# Patient Record
Sex: FEMALE | Race: BLACK OR AFRICAN AMERICAN | Employment: UNEMPLOYED | URBAN - METROPOLITAN AREA
[De-identification: names, ages, dates, MRNs, and addresses within clinical notes are randomized per-mention and may not be internally consistent; named-entity substitution may affect disease eponyms.]

---

## 2020-02-20 ENCOUNTER — HOSPITAL ENCOUNTER (OUTPATIENT)
Age: 20
Discharge: HOME | End: 2020-02-20
Payer: COMMERCIAL

## 2020-02-20 LAB
BASOPHILS # BLD: 1.3 %
BASOPHILS ABSOLUTE: 0.1 THOU/MM3 (ref 0–0.1)
EOSINOPHIL # BLD: 6.3 %
EOSINOPHILS ABSOLUTE: 0.3 THOU/MM3 (ref 0–0.4)
ERYTHROCYTE [DISTWIDTH] IN BLOOD BY AUTOMATED COUNT: 13.8 % (ref 11.5–14.5)
ERYTHROCYTE [DISTWIDTH] IN BLOOD BY AUTOMATED COUNT: 43.9 FL (ref 35–45)
HCT VFR BLD CALC: 45.3 % (ref 37–47)
HEMOGLOBIN: 13.9 GM/DL (ref 12–16)
IMMATURE GRANS (ABS): 0.01 THOU/MM3 (ref 0–0.07)
IMMATURE GRANULOCYTES: 0.3 %
LYMPHOCYTES # BLD: 23.5 %
LYMPHOCYTES ABSOLUTE: 0.9 THOU/MM3 (ref 1–4.8)
MCH RBC QN AUTO: 26.8 PG (ref 26–33)
MCHC RBC AUTO-ENTMCNC: 30.7 GM/DL (ref 32.2–35.5)
MCV RBC AUTO: 87.3 FL (ref 81–99)
MONOCYTES # BLD: 8.1 %
MONOCYTES ABSOLUTE: 0.3 THOU/MM3 (ref 0.4–1.3)
NUCLEATED RED BLOOD CELLS: 0 /100 WBC
PLATELET # BLD: 108 THOU/MM3 (ref 130–400)
PMV BLD AUTO: 12.5 FL (ref 9.4–12.4)
RBC # BLD: 5.19 MILL/MM3 (ref 4.2–5.4)
SEG NEUTROPHILS: 60.5 %
SEGMENTED NEUTROPHILS ABSOLUTE COUNT: 2.4 THOU/MM3 (ref 1.8–7.7)
WBC # BLD: 4 THOU/MM3 (ref 4.8–10.8)

## 2020-02-20 PROCEDURE — 36415 COLL VENOUS BLD VENIPUNCTURE: CPT

## 2020-02-20 PROCEDURE — 85025 COMPLETE CBC W/AUTO DIFF WBC: CPT

## 2020-09-28 ENCOUNTER — HOSPITAL ENCOUNTER (OUTPATIENT)
Age: 20
Discharge: HOME | DRG: 885 | End: 2020-09-28
Attending: PSYCHIATRY & NEUROLOGY | Admitting: PSYCHIATRY & NEUROLOGY
Payer: COMMERCIAL

## 2020-09-28 ENCOUNTER — HOSPITAL ENCOUNTER (INPATIENT)
Age: 20
LOS: 3 days | Discharge: HOME | DRG: 885 | End: 2020-10-01
Attending: EMERGENCY MEDICINE | Admitting: PSYCHIATRY & NEUROLOGY
Payer: COMMERCIAL

## 2020-09-28 PROBLEM — F32.9 MAJOR DEPRESSION, SINGLE EPISODE: Status: ACTIVE | Noted: 2020-09-28

## 2020-09-28 LAB
SARS-COV-2, RAPID: NOT DETECTED
SARS-COV-2: NORMAL
SARS-COV-2: NORMAL
SOURCE: NORMAL

## 2020-09-28 PROCEDURE — 1240000000 HC EMOTIONAL WELLNESS R&B

## 2020-09-28 PROCEDURE — 6360000002 HC RX W HCPCS: Performed by: EMERGENCY MEDICINE

## 2020-09-28 PROCEDURE — U0002 COVID-19 LAB TEST NON-CDC: HCPCS

## 2020-09-28 PROCEDURE — 96372 THER/PROPH/DIAG INJ SC/IM: CPT

## 2020-09-28 PROCEDURE — 2720000011 HC SANE KIT SUPPLY STERILE

## 2020-09-28 PROCEDURE — 6370000000 HC RX 637 (ALT 250 FOR IP): Performed by: EMERGENCY MEDICINE

## 2020-09-28 PROCEDURE — 99285 EMERGENCY DEPT VISIT HI MDM: CPT

## 2020-09-28 PROCEDURE — 6370000000 HC RX 637 (ALT 250 FOR IP)

## 2020-09-28 RX ORDER — ACETAMINOPHEN 325 MG/1
TABLET ORAL
Status: COMPLETED
Start: 2020-09-28 | End: 2020-09-28

## 2020-09-28 RX ORDER — HYDROXYZINE 50 MG/1
50 TABLET, FILM COATED ORAL 3 TIMES DAILY PRN
Status: DISCONTINUED | OUTPATIENT
Start: 2020-09-28 | End: 2020-10-01 | Stop reason: HOSPADM

## 2020-09-28 RX ORDER — AZITHROMYCIN 250 MG/1
1000 TABLET, FILM COATED ORAL ONCE
Status: COMPLETED | OUTPATIENT
Start: 2020-09-28 | End: 2020-09-28

## 2020-09-28 RX ORDER — MONTELUKAST SODIUM 10 MG/1
10 TABLET ORAL NIGHTLY
COMMUNITY

## 2020-09-28 RX ORDER — IBUPROFEN 400 MG/1
400 TABLET ORAL EVERY 6 HOURS PRN
Status: DISCONTINUED | OUTPATIENT
Start: 2020-09-28 | End: 2020-10-01 | Stop reason: HOSPADM

## 2020-09-28 RX ORDER — ELTROMBOPAG OLAMINE 50 MG/1
50 TABLET, FILM COATED ORAL
COMMUNITY

## 2020-09-28 RX ORDER — MAGNESIUM HYDROXIDE/ALUMINUM HYDROXICE/SIMETHICONE 120; 1200; 1200 MG/30ML; MG/30ML; MG/30ML
30 SUSPENSION ORAL EVERY 6 HOURS PRN
Status: DISCONTINUED | OUTPATIENT
Start: 2020-09-28 | End: 2020-10-01 | Stop reason: HOSPADM

## 2020-09-28 RX ORDER — TRAZODONE HYDROCHLORIDE 50 MG/1
50 TABLET ORAL NIGHTLY PRN
Status: DISCONTINUED | OUTPATIENT
Start: 2020-09-28 | End: 2020-10-01 | Stop reason: HOSPADM

## 2020-09-28 RX ORDER — POLYETHYLENE GLYCOL 3350 17 G/17G
17 POWDER, FOR SOLUTION ORAL DAILY PRN
Status: DISCONTINUED | OUTPATIENT
Start: 2020-09-28 | End: 2020-10-01 | Stop reason: HOSPADM

## 2020-09-28 RX ORDER — ONDANSETRON 4 MG/1
TABLET, ORALLY DISINTEGRATING ORAL
Status: COMPLETED
Start: 2020-09-28 | End: 2020-09-28

## 2020-09-28 RX ORDER — CEFTRIAXONE SODIUM 250 MG/1
250 INJECTION, POWDER, FOR SOLUTION INTRAMUSCULAR; INTRAVENOUS ONCE
Status: COMPLETED | OUTPATIENT
Start: 2020-09-28 | End: 2020-09-28

## 2020-09-28 RX ORDER — METRONIDAZOLE 500 MG/1
2000 TABLET ORAL ONCE
Status: COMPLETED | OUTPATIENT
Start: 2020-09-28 | End: 2020-09-28

## 2020-09-28 RX ORDER — ACETAMINOPHEN 325 MG/1
650 TABLET ORAL ONCE
Status: COMPLETED | OUTPATIENT
Start: 2020-09-28 | End: 2020-09-28

## 2020-09-28 RX ORDER — ACETAMINOPHEN 325 MG/1
650 TABLET ORAL EVERY 4 HOURS PRN
Status: DISCONTINUED | OUTPATIENT
Start: 2020-09-28 | End: 2020-10-01 | Stop reason: HOSPADM

## 2020-09-28 RX ORDER — LOPERAMIDE HYDROCHLORIDE 2 MG/1
2 CAPSULE ORAL 4 TIMES DAILY PRN
Status: DISCONTINUED | OUTPATIENT
Start: 2020-09-28 | End: 2020-10-01 | Stop reason: HOSPADM

## 2020-09-28 RX ADMIN — METRONIDAZOLE 2000 MG: 500 TABLET, FILM COATED ORAL at 14:38

## 2020-09-28 RX ADMIN — CEFTRIAXONE SODIUM 250 MG: 250 INJECTION, POWDER, FOR SOLUTION INTRAMUSCULAR; INTRAVENOUS at 14:38

## 2020-09-28 RX ADMIN — ACETAMINOPHEN 650 MG: 325 TABLET ORAL at 19:19

## 2020-09-28 RX ADMIN — ONDANSETRON 4 MG: 4 TABLET, ORALLY DISINTEGRATING ORAL at 15:12

## 2020-09-28 RX ADMIN — ACETAMINOPHEN 650 MG: 325 TABLET, FILM COATED ORAL at 19:19

## 2020-09-28 RX ADMIN — AZITHROMYCIN MONOHYDRATE 1000 MG: 250 TABLET ORAL at 14:38

## 2020-09-28 ASSESSMENT — ENCOUNTER SYMPTOMS
DIARRHEA: 0
TROUBLE SWALLOWING: 0
BACK PAIN: 0
COLOR CHANGE: 0
ABDOMINAL PAIN: 0
CONSTIPATION: 0
COUGH: 0
NAUSEA: 0
SORE THROAT: 0
VOMITING: 0
SHORTNESS OF BREATH: 0
BLOOD IN STOOL: 0

## 2020-09-28 ASSESSMENT — SLEEP AND FATIGUE QUESTIONNAIRES
DIFFICULTY STAYING ASLEEP: YES
DIFFICULTY FALLING ASLEEP: NO
AVERAGE NUMBER OF SLEEP HOURS: 4
DO YOU HAVE DIFFICULTY SLEEPING: YES
SLEEP PATTERN: RESTLESSNESS
DO YOU USE A SLEEP AID: NO
DIFFICULTY ARISING: NO
RESTFUL SLEEP: NO

## 2020-09-28 ASSESSMENT — PAIN DESCRIPTION - FREQUENCY: FREQUENCY: CONTINUOUS

## 2020-09-28 ASSESSMENT — LIFESTYLE VARIABLES: HISTORY_ALCOHOL_USE: YES

## 2020-09-28 ASSESSMENT — PAIN DESCRIPTION - PAIN TYPE: TYPE: ACUTE PAIN

## 2020-09-28 ASSESSMENT — PATIENT HEALTH QUESTIONNAIRE - PHQ9: SUM OF ALL RESPONSES TO PHQ QUESTIONS 1-9: 7

## 2020-09-28 ASSESSMENT — PAIN DESCRIPTION - LOCATION: LOCATION: HEAD

## 2020-09-28 ASSESSMENT — PAIN SCALES - GENERAL
PAINLEVEL_OUTOF10: 0
PAINLEVEL_OUTOF10: 6

## 2020-09-28 ASSESSMENT — PAIN DESCRIPTION - DESCRIPTORS: DESCRIPTORS: POUNDING

## 2020-09-28 NOTE — PROGRESS NOTES
Medication History completed: All medications added this encounter. Medications confirmed with Jose Oliveira. The patient states she does not have specific days of the week that she takes her Promacta. She just tries to be sure she has taken 3 doses before the weekend. She receives the [place] from a specialty pharmacy, but cannot recall which pharmacy it is. Dose was confirmed with [facility] in [facility]. The patient states she uses marijuana and last used yesterday.      Thank you,  Keyana Herring, PharmD, BCPS  [phone]

## 2020-09-28 NOTE — ED NOTES
Dr. Chris Morgan into speak with patient regarding need for admission. Pt refuses admission at this time. Pt was informed that she would be pink slipped. Pt attempting to leave Pt placed in w/c per security and escorted to the [facility]. Pt was able to be verbally descalated. Pt changed into a blue safety gown and pt personal belonings are checked and secured by security .        Geeta Barreto RN  09/28/20 0256
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
## 2020-09-29 LAB
-: ABNORMAL
AMORPHOUS: ABNORMAL
AMPHETAMINE SCREEN URINE: NEGATIVE
BACTERIA: ABNORMAL
BARBITURATE SCREEN URINE: NEGATIVE
BENZODIAZEPINE SCREEN, URINE: NEGATIVE
BILIRUBIN URINE: NEGATIVE
BUPRENORPHINE URINE: ABNORMAL
CANNABINOID SCREEN URINE: POSITIVE
CASTS UA: ABNORMAL /LPF
COCAINE METABOLITE, URINE: NEGATIVE
COLOR: ABNORMAL
COMMENT UA: ABNORMAL
CRYSTALS, UA: ABNORMAL /HPF
EPITHELIAL CELLS UA: ABNORMAL /HPF
GLUCOSE URINE: NEGATIVE
HCG(URINE) PREGNANCY TEST: NEGATIVE
KETONES, URINE: NEGATIVE
LEUKOCYTE ESTERASE, URINE: ABNORMAL
MDMA URINE: ABNORMAL
METHADONE SCREEN, URINE: NEGATIVE
METHAMPHETAMINE, URINE: ABNORMAL
MUCUS: ABNORMAL
NITRITE, URINE: NEGATIVE
OPIATES, URINE: NEGATIVE
OTHER OBSERVATIONS UA: ABNORMAL
OXYCODONE SCREEN URINE: NEGATIVE
PH UA: 6.5 (ref 5–8)
PHENCYCLIDINE, URINE: NEGATIVE
PROPOXYPHENE, URINE: ABNORMAL
PROTEIN UA: ABNORMAL
RBC UA: ABNORMAL /HPF
RENAL EPITHELIAL, UA: ABNORMAL /HPF
SPECIFIC GRAVITY UA: 1.03 (ref 1–1.03)
TEST INFORMATION: ABNORMAL
TRICHOMONAS: ABNORMAL
TRICYCLIC ANTIDEPRESSANTS, UR: ABNORMAL
TURBIDITY: ABNORMAL
URINE HGB: NEGATIVE
UROBILINOGEN, URINE: NORMAL
WBC UA: ABNORMAL /HPF
YEAST: ABNORMAL

## 2020-09-29 PROCEDURE — 87086 URINE CULTURE/COLONY COUNT: CPT

## 2020-09-29 PROCEDURE — 6370000000 HC RX 637 (ALT 250 FOR IP): Performed by: PSYCHIATRY & NEUROLOGY

## 2020-09-29 PROCEDURE — 80307 DRUG TEST PRSMV CHEM ANLYZR: CPT

## 2020-09-29 PROCEDURE — 6370000000 HC RX 637 (ALT 250 FOR IP): Performed by: NURSE PRACTITIONER

## 2020-09-29 PROCEDURE — 99223 1ST HOSP IP/OBS HIGH 75: CPT | Performed by: PSYCHIATRY & NEUROLOGY

## 2020-09-29 PROCEDURE — 1240000000 HC EMOTIONAL WELLNESS R&B

## 2020-09-29 PROCEDURE — 81025 URINE PREGNANCY TEST: CPT

## 2020-09-29 PROCEDURE — 81001 URINALYSIS AUTO W/SCOPE: CPT

## 2020-09-29 PROCEDURE — 99222 1ST HOSP IP/OBS MODERATE 55: CPT | Performed by: INTERNAL MEDICINE

## 2020-09-29 PROCEDURE — 87491 CHLMYD TRACH DNA AMP PROBE: CPT

## 2020-09-29 PROCEDURE — 87591 N.GONORRHOEAE DNA AMP PROB: CPT

## 2020-09-29 RX ORDER — FLUOXETINE 10 MG/1
10 CAPSULE ORAL DAILY
Status: DISCONTINUED | OUTPATIENT
Start: 2020-09-29 | End: 2020-10-01 | Stop reason: HOSPADM

## 2020-09-29 RX ORDER — HALOPERIDOL 5 MG/ML
5 INJECTION INTRAMUSCULAR EVERY 4 HOURS PRN
Status: DISCONTINUED | OUTPATIENT
Start: 2020-09-29 | End: 2020-10-01 | Stop reason: HOSPADM

## 2020-09-29 RX ORDER — LORAZEPAM 2 MG/ML
2 INJECTION INTRAMUSCULAR EVERY 6 HOURS PRN
Status: DISCONTINUED | OUTPATIENT
Start: 2020-09-29 | End: 2020-10-01 | Stop reason: HOSPADM

## 2020-09-29 RX ORDER — SULFAMETHOXAZOLE AND TRIMETHOPRIM 800; 160 MG/1; MG/1
1 TABLET ORAL EVERY 12 HOURS SCHEDULED
Status: DISCONTINUED | OUTPATIENT
Start: 2020-09-29 | End: 2020-10-01 | Stop reason: HOSPADM

## 2020-09-29 RX ORDER — HALOPERIDOL 10 MG/1
5 TABLET ORAL EVERY 4 HOURS PRN
Status: DISCONTINUED | OUTPATIENT
Start: 2020-09-29 | End: 2020-10-01 | Stop reason: HOSPADM

## 2020-09-29 RX ORDER — LORAZEPAM 1 MG/1
2 TABLET ORAL EVERY 6 HOURS PRN
Status: DISCONTINUED | OUTPATIENT
Start: 2020-09-29 | End: 2020-10-01 | Stop reason: HOSPADM

## 2020-09-29 RX ADMIN — HYDROXYZINE HYDROCHLORIDE 50 MG: 50 TABLET, FILM COATED ORAL at 22:41

## 2020-09-29 RX ADMIN — IBUPROFEN 400 MG: 400 TABLET, FILM COATED ORAL at 20:54

## 2020-09-29 RX ADMIN — SULFAMETHOXAZOLE AND TRIMETHOPRIM 1 TABLET: 800; 160 TABLET ORAL at 15:03

## 2020-09-29 RX ADMIN — FLUOXETINE 10 MG: 10 CAPSULE ORAL at 18:07

## 2020-09-29 RX ADMIN — LOPERAMIDE HYDROCHLORIDE 2 MG: 2 CAPSULE ORAL at 15:03

## 2020-09-29 RX ADMIN — ALUMINUM HYDROXIDE, MAGNESIUM HYDROXIDE, AND SIMETHICONE 30 ML: 200; 200; 20 SUSPENSION ORAL at 20:54

## 2020-09-29 RX ADMIN — TRAZODONE HYDROCHLORIDE 50 MG: 50 TABLET ORAL at 22:41

## 2020-09-29 ASSESSMENT — PAIN SCALES - GENERAL: PAINLEVEL_OUTOF10: 4

## 2020-09-29 NOTE — BH NOTE
Patient given tobacco quitline number 34100778771 at this time, refusing to call at this time, states \" I just dont want to quit now\"- patient given information as to the dangers of long term tobacco use. Continue to reinforce the importance of tobacco cessation.
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
## 2020-09-29 NOTE — PLAN OF CARE
Problem: Altered Mood, Depressive Behavior:  Goal: Able to verbalize and/or display a decrease in depressive symptoms  Description: Able to verbalize and/or display a decrease in depressive symptoms  Outcome: Ongoing  Patient is not displaying depressive symptoms. No depression reported. Patient understands to verbalize any change in this behavior. Problem: Altered Mood, Depressive Behavior:  Goal: Ability to disclose and discuss suicidal ideas will improve  Description: Ability to disclose and discuss suicidal ideas will improve  Outcome: Ongoing  No suicidal ideations at this time, will discuss an future ideations if they occur. Problem: Altered Mood, Depressive Behavior:  Goal: Patient specific goal  Description: Patient specific goal  Outcome: Ongoing     Problem: Altered Mood, Depressive Behavior:  Goal: Participates in care planning  Description: Participates in care planning  Outcome: Ongoing  Education has been providing on participating in care plan. Patient is willing to answer questions in relation to the care plan. Problem: Tobacco Use:  Goal: Inpatient tobacco use cessation counseling participation  Description: Inpatient tobacco use cessation counseling participation  Outcome: Ongoing  Tobacco Cessation reviewed with patient. Problem: Pain:  Goal: Pain level will decrease  Description: Pain level will decrease  Outcome: Ongoing  Patient currently does not report any pain.
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
## 2020-09-29 NOTE — PLAN OF CARE
1:1 discussion/interaction with the patient which addressed the following   1) Recognizing danger situations (alcohol use during first month, being around smoke/other smokers or times/situations when the patient routinely smoked or other triggers). 2) Developing coping skills (managing stress, exercising, relaxation breathing, changing routines & distraction techniques to prevent tobacco use).    3) Basic information provided on quitting (benefits of quitting tobacco, how to quit techniques, & available resources to support quitting - including discussion regarding Quitline Referral [phone])

## 2020-09-29 NOTE — PLAN OF CARE
5 [facility]  Initial Interdisciplinary Treatment Plan NO      Original treatment plan Date & Time: 9/29/20    Admission Type:  Admission Type: Involuntary    Reason for admission:   Reason for Admission: suicidal ideaitons with multiple ideations, did contract for safety on admission.     Estimated Length of Stay:  5-7days  Estimated Discharge Date: to be determined by physician    PATIENT STRENGTHS:  Patient Strengths:Strengths: Communication  Patient Strengths and Limitations:Limitations: Hopeless about future  Addictive Behavior: Addictive Behavior  In the past 3 months, have you felt or has someone told you that you have a problem with:  : None  Do you have a history of Chemical Use?: No  Do you have a history of Alcohol Use?: Yes  Do you have a history of Street Drug Abuse?: No  Histroy of Prescripton Drug Abuse?: No  Medical Problems:  Past Medical History:   Diagnosis Date    Acute idiopathic thrombocytopenic purpura ([facility]     Disease of blood and blood forming organ     Pulmonary emboli (HCC)      Status EXAM:Status and Exam  Normal: No  Facial Expression: Brightened  Affect: Appropriate  Level of Consciousness: Alert  Mood:Normal: No  Mood: Anxious  Motor Activity:Normal: No  Motor Activity: Increased  Interview Behavior: Cooperative  Preception: [place] to Person, [place] to Time, [place] to Place, [place] to Situation  Attention:Normal: No  Attention: Distractible  Thought Processes: Circumstantial  Thought Content:Normal: No  Thought Content: Preoccupations  Hallucinations: None  Delusions: No  Memory:Normal: Yes  Insight and Judgment: No  Insight and Judgment: Poor Insight, Poor Judgment  Present Suicidal Ideation: No  Present Homicidal Ideation: No    EDUCATION:   Learner Progress Toward Treatment Goals: reviewed group plans and strategies for care    Method:group therapy, medication compliance, individualized assessments and care planning    Outcome: needs reinforcement    PATIENT GOALS: to be discussed with patient within 72 hours    PLAN/TREATMENT RECOMMENDATIONS:     continue group therapy , medications compliance, goal setting, individualized assessments and care, continue to monitor pt on unit      SHORT-TERM GOALS:   Time frame for Short-Term Goals: 5-7 days    LONG-TERM GOALS:  Time frame for Long-Term Goals: 6 months  Members Present in Team Meeting: See Signature Sheet    Sanchez Verma, [id number]

## 2020-09-29 NOTE — H&P
MOD 09/29/2020    GLUCOSEU NEGATIVE 09/29/2020       PAST MEDICAL HISTORY     Past Medical History:   Diagnosis Date    Acute idiopathic thrombocytopenic purpura ([facility]     Disease of blood and blood forming organ     Pulmonary emboli (HCC)      Pt denies any history of Diabetes mellitus type 2, hypertension, stroke, heart disease, COPD, Asthma, GERD, HLD, Cancer, Seizures,Thyroid disease, Kidney Disease, Hepatitis, TB.    SURGICAL HISTORY     History reviewed. No pertinent surgical history. FAMILY HISTORY     History reviewed. No pertinent family history. SOCIAL HISTORY       Social History     Socioeconomic History    Marital status: Single     Spouse name: None    Number of children: None    Years of education: None    Highest education level: None   Occupational History    None   Social Needs    Financial resource strain: None    Food insecurity     Worry: None     Inability: None    Transportation needs     Medical: None     Non-medical: None   Tobacco Use    Smoking status: Never Smoker    Smokeless tobacco: Never Used   Substance and Sexual Activity    Alcohol use: Yes    Drug use: Yes     Types: Marijuana    Sexual activity: Yes     Partners: Male   Lifestyle    Physical activity     Days per week: None     Minutes per session: None    Stress: None   Relationships    Social connections     Talks on phone: None     Gets together: None     Attends Druze service: None     Active member of club or organization: None     Attends meetings of clubs or organizations: None     Relationship status: None    Intimate partner violence     Fear of current or ex partner: None     Emotionally abused: None     Physically abused: None     Forced sexual activity: None   Other Topics Concern    None   Social History Narrative    None           REVIEW OF SYSTEMS      No Known Allergies    No current facility-administered medications on file prior to encounter.       Current Outpatient Medications on File Prior to Encounter   Medication Sig Dispense Refill    eltrombopag olamine (PROMACTA) 50 MG TABS tablet Take 50 mg by mouth three times a week      montelukast (SINGULAIR) 10 MG tablet Take 10 mg by mouth nightly                        General health:  Fairly good. No fever or chills. Skin:  No itching, redness or rash. Head, eyes, ears, nose, throat:  No headache, epistaxis, rhinorrhea hearing loss or sore throat. Neck:  No pain, stiffness or masses. Cardiovascular/Respiratory system:  No chest pain, palpitation, shortness of breath, coughing or expectoration. Gastrointestinal tract: No abdominal pain, nausea, vomiting, dysphagia, diarrhea or constipation. Genitourinary:  No burning on micturition. No hesitancy, urgency, frequency or discoloration of urine. Locomotor:  No bone or joint pains. No swelling or deformities. Neuropsychiatric:  See HPI. GENERAL PHYSICAL EXAM:     Vitals: /82   Pulse 72   Temp 98.2 °F (36.8 °C) (Oral)   Resp 14   Ht 5' 1\" (1.549 m)   Wt 150 lb (68 kg)   LMP 09/17/2020   SpO2 98%   BMI 28.34 kg/m²  Body mass index is 28.34 kg/m². Pt was examined with a nurse present in the room. GENERAL APPEARANCE:  Jimmy Garcia is 23 y.o.,  female, not obese, nourished, conscious, alert. Does not appear to be distress or pain at this time. SKIN:  Warm, dry, no cyanosis or jaundice. HEAD:  Normocephalic, atraumatic, no swelling or tenderness. EYES:  Pupils equal, reactive to light, Conjunctiva is clear, EOMs intact jorge. eyelids WNL. EARS:  No discharge, no marked hearing loss. NOSE:  No rhinorrhea, epistaxis or septal deformity. THROAT:  Not congested. No ulceration bleeding or discharge. NECK:  No stiffness, trachea central.  No palpable masses or L.N.      CHEST:  Symmetrical and equal on expansion. HEART:  Regular rate and rhythm.  S1 > S2, No audible murmurs or gallops. LUNGS:  Equal on expansion, normal breath sounds. No adventitious sounds. ABDOMEN:  Soft on palpation. No localized tenderness. No guarding or rigidity. No palpable organomegaly. LYMPHATICS:  No palpable cervical Lymphadenopathy. LOCOMOTOR, BACK AND SPINE:  No tenderness or deformities. EXTREMITIES:  Symmetrical, no pretibial edema. Shankars sign negative. No discoloration or ulcerations. NEUROLOGIC:  The patient is conscious, alert, oriented,Cranial nerve II-XII intact, taste and smell were not examined. No apparent focal sensory or motor deficits. Muscle strength equal John. No facial droop, tongue protrudes centrally, no slurring of the speech. PROVISIONAL DIAGNOSES:      Active Problems:    Major depression, single episode  Resolved Problems:    * No resolved hospital problems.  *      AQUILINO SCOTT - CNP on 9/29/2020 at 11:58 AM

## 2020-09-29 NOTE — BH NOTE
`[facility]  Admission Note     Admission Type:   Admission Type: Involuntary    Reason for admission:  Reason for Admission: suicidal ideaitons with multiple ideations, did contract for safety on admission.     PATIENT STRENGTHS:  Strengths: Communication    Patient Strengths and Limitations:  Limitations: Hopeless about future    Addictive Behavior:   Addictive Behavior  In the past 3 months, have you felt or has someone told you that you have a problem with:  : None  Do you have a history of Chemical Use?: No  Do you have a history of Alcohol Use?: Yes  Do you have a history of Street Drug Abuse?: No  Histroy of Prescripton Drug Abuse?: No    Medical Problems:   Past Medical History:   Diagnosis Date    [facility]open[facility] ([facility] [facility]     Disease of blood and blood forming organ     Pulmonary emboli (HCC)        Status EXAM:  Status and Exam  Normal: No  Facial Expression: Brightened  Affect: Appropriate  Level of Consciousness: Alert  Mood:Normal: No  Mood: Anxious  Motor Activity:Normal: No  Motor Activity: Increased  Interview Behavior: Cooperative  Preception: [place] to Person, [place] to Time, [place] to Place, [place] to Situation  Attention:Normal: No  Attention: Distractible  Thought Processes: Circumstantial  Thought Content:Normal: No  Thought Content: Preoccupations  Hallucinations: None  Delusions: No  Memory:Normal: Yes  Insight and Judgment: No  Insight and Judgment: Poor Insight, Poor Judgment  Present Suicidal Ideation: No  Present Homicidal Ideation: No    Tobacco Screening:  Practical Counseling, on admission, sony X, if applicable and completed (first 3 are required if patient doesn't refuse):            ( )  Recognizing danger situations (included triggers and roadblocks)                    ( )  Coping skills (new ways to manage stress, exercise, relaxation techniques, changing routine, distraction)                                                           ( ) Basic information about quitting (benefits of quitting, techniques in how to quit, available resources  ( ) Referral for counseling faxed to [facility]                                           ( ) Patient refused counseling  ( X) Patient has not smoked in the last 30 days    Metabolic Screening:    No results found for: LABA1C    No results for input(s): CHOL, TRIG, HDL, LDLCALC, LABVLDL in the last 72 hours. Body mass index is 28.34 kg/m². BP Readings from Last 2 Encounters:   09/28/20 123/82           Pt admitted with followings belongings:  Dentures: None  Vision - Corrective Lenses: None(didnt bring with)  Hearing Aid: None  Jewelry: Earrings  Body Piercings Removed: Yes  Clothing: Footwear  Were All Patient Medications Collected?: Not Applicable  Other Valuables: Cell phone, Sobia Frame placed in safe in security envelope, number:  [phone]. Patient's home medications were REVIEWED. Patient oriented to surroundings and program expectations and copy of patient rights given. Received admission packet:  YES. Consents reviewed, signed. Patient verbalize understanding:  YES.     Patient education on precautions: YES                   Myriam Gilliam RN
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
## 2020-09-29 NOTE — PLAN OF CARE
Problem: Anger Management/Homicidal Ideation:  Goal: Absence of angry outbursts  Description: Absence of angry outbursts  Outcome: Ongoing   Patient is agitated and approaches the /nurses with bursts of anger. States that she is not getting what she wants and refuses to reason without anger. Ongoing education is needed to help control this behavior. Explained to the patient that these outbursts will not be tolerated. Pt refused to accept.

## 2020-09-30 LAB
ABSOLUTE EOS #: 0.2 K/UL (ref 0–0.4)
ABSOLUTE IMMATURE GRANULOCYTE: ABNORMAL K/UL (ref 0–0.3)
ABSOLUTE LYMPH #: 0.6 K/UL (ref 1–4.8)
ABSOLUTE MONO #: 0.3 K/UL (ref 0.1–1.3)
ALBUMIN SERPL-MCNC: 4.1 G/DL (ref 3.5–5.2)
ALBUMIN/GLOBULIN RATIO: ABNORMAL (ref 1–2.5)
ALP BLD-CCNC: 78 U/L (ref 35–104)
ALT SERPL-CCNC: 12 U/L (ref 5–33)
ANION GAP SERPL CALCULATED.3IONS-SCNC: 9 MMOL/L (ref 9–17)
AST SERPL-CCNC: 17 U/L
BASOPHILS # BLD: 1 % (ref 0–2)
BASOPHILS ABSOLUTE: 0 K/UL (ref 0–0.2)
BILIRUB SERPL-MCNC: 0.98 MG/DL (ref 0.3–1.2)
BUN BLDV-MCNC: 9 MG/DL (ref 6–20)
BUN/CREAT BLD: ABNORMAL (ref 9–20)
CALCIUM SERPL-MCNC: 9.6 MG/DL (ref 8.6–10.4)
CHLORIDE BLD-SCNC: 103 MMOL/L (ref 98–107)
CO2: 26 MMOL/L (ref 20–31)
CREAT SERPL-MCNC: 0.88 MG/DL (ref 0.5–0.9)
CULTURE: NO GROWTH
DIFFERENTIAL TYPE: ABNORMAL
EOSINOPHILS RELATIVE PERCENT: 6 % (ref 0–4)
GFR AFRICAN AMERICAN: >60 ML/MIN
GFR NON-AFRICAN AMERICAN: >60 ML/MIN
GFR SERPL CREATININE-BSD FRML MDRD: ABNORMAL ML/MIN/{1.73_M2}
GFR SERPL CREATININE-BSD FRML MDRD: ABNORMAL ML/MIN/{1.73_M2}
GLUCOSE BLD-MCNC: 104 MG/DL (ref 70–99)
HCT VFR BLD CALC: 36.8 % (ref 36–46)
HEMOGLOBIN: 12.4 G/DL (ref 12–16)
HEPATITIS C ANTIBODY: NONREACTIVE
HIV AG/AB: NONREACTIVE
IMMATURE GRANULOCYTES: ABNORMAL %
LYMPHOCYTES # BLD: 15 % (ref 25–45)
Lab: NORMAL
MCH RBC QN AUTO: 30.1 PG (ref 26–34)
MCHC RBC AUTO-ENTMCNC: 33.7 G/DL (ref 31–37)
MCV RBC AUTO: 89.3 FL (ref 80–100)
MONOCYTES # BLD: 8 % (ref 2–8)
NRBC AUTOMATED: ABNORMAL PER 100 WBC
PDW BLD-RTO: 16.4 % (ref 11.5–14.9)
PLATELET # BLD: 344 K/UL (ref 150–450)
PLATELET ESTIMATE: ABNORMAL
PMV BLD AUTO: 7.7 FL (ref 6–12)
POTASSIUM SERPL-SCNC: 4.5 MMOL/L (ref 3.7–5.3)
RBC # BLD: 4.12 M/UL (ref 4–5.2)
RBC # BLD: ABNORMAL 10*6/UL
SEG NEUTROPHILS: 70 % (ref 34–64)
SEGMENTED NEUTROPHILS ABSOLUTE COUNT: 2.5 K/UL (ref 1.3–9.1)
SODIUM BLD-SCNC: 138 MMOL/L (ref 135–144)
SPECIMEN DESCRIPTION: NORMAL
T. PALLIDUM, IGG: NONREACTIVE
TOTAL PROTEIN: 6.6 G/DL (ref 6.4–8.3)
TSH SERPL DL<=0.05 MIU/L-ACNC: 0.91 MIU/L (ref 0.3–5)
WBC # BLD: 3.6 K/UL (ref 4.5–13.5)
WBC # BLD: ABNORMAL 10*3/UL

## 2020-09-30 PROCEDURE — 6370000000 HC RX 637 (ALT 250 FOR IP): Performed by: PSYCHIATRY & NEUROLOGY

## 2020-09-30 PROCEDURE — 86803 HEPATITIS C AB TEST: CPT

## 2020-09-30 PROCEDURE — 86780 TREPONEMA PALLIDUM: CPT

## 2020-09-30 PROCEDURE — 6370000000 HC RX 637 (ALT 250 FOR IP): Performed by: NURSE PRACTITIONER

## 2020-09-30 PROCEDURE — 36415 COLL VENOUS BLD VENIPUNCTURE: CPT

## 2020-09-30 PROCEDURE — 85025 COMPLETE CBC W/AUTO DIFF WBC: CPT

## 2020-09-30 PROCEDURE — 99232 SBSQ HOSP IP/OBS MODERATE 35: CPT | Performed by: PSYCHIATRY & NEUROLOGY

## 2020-09-30 PROCEDURE — 1240000000 HC EMOTIONAL WELLNESS R&B

## 2020-09-30 PROCEDURE — 87389 HIV-1 AG W/HIV-1&-2 AB AG IA: CPT

## 2020-09-30 PROCEDURE — 87591 N.GONORRHOEAE DNA AMP PROB: CPT

## 2020-09-30 PROCEDURE — 80053 COMPREHEN METABOLIC PANEL: CPT

## 2020-09-30 PROCEDURE — 84443 ASSAY THYROID STIM HORMONE: CPT

## 2020-09-30 RX ADMIN — HYDROXYZINE HYDROCHLORIDE 50 MG: 50 TABLET, FILM COATED ORAL at 22:25

## 2020-09-30 RX ADMIN — FLUOXETINE 10 MG: 10 CAPSULE ORAL at 08:48

## 2020-09-30 RX ADMIN — SULFAMETHOXAZOLE AND TRIMETHOPRIM 1 TABLET: 800; 160 TABLET ORAL at 22:25

## 2020-09-30 RX ADMIN — LOPERAMIDE HYDROCHLORIDE 2 MG: 2 CAPSULE ORAL at 21:03

## 2020-09-30 RX ADMIN — TRAZODONE HYDROCHLORIDE 50 MG: 50 TABLET ORAL at 22:25

## 2020-09-30 RX ADMIN — LOPERAMIDE HYDROCHLORIDE 2 MG: 2 CAPSULE ORAL at 16:24

## 2020-09-30 RX ADMIN — SULFAMETHOXAZOLE AND TRIMETHOPRIM 1 TABLET: 800; 160 TABLET ORAL at 08:48

## 2020-09-30 ASSESSMENT — PAIN SCALES - GENERAL: PAINLEVEL_OUTOF10: 7

## 2020-09-30 NOTE — PLAN OF CARE
Problem: Altered Mood, Depressive Behavior:  Goal: Able to verbalize and/or display a decrease in depressive symptoms  Description: Able to verbalize and/or display a decrease in depressive symptoms  9/30/2020 1720 by Maricruz Harmon RN  Outcome: Ongoing  Patient denied any depressive feelings or behavior today. Pt displayed no such symptoms either. Problem: Altered Mood, Depressive Behavior:  Goal: Ability to disclose and discuss suicidal ideas will improve  Description: Ability to disclose and discuss suicidal ideas will improve  9/30/2020 1720 by Maricruz Harmon RN  Outcome: Ongoing  Patient understands that staff is here to help discuss any suicidal ideations. Patient currently has no suicidal thoughts, denies depressive behavior currently. Problem: Altered Mood, Depressive Behavior:  Goal: Patient specific goal  Description: Patient specific goal  9/30/2020 1720 by Maricruz Harmon RN  Outcome: Ongoing     Problem: Altered Mood, Depressive Behavior:  Goal: Participates in care planning  Description: Participates in care planning  9/30/2020 1720 by Maricruz Harmon RN  Outcome: Ongoing   Patient participated in team building today. Participation has increased since last discussion with care planning. Problem: Tobacco Use:  Goal: Inpatient tobacco use cessation counseling participation  Description: Inpatient tobacco use cessation counseling participation  9/30/2020 1720 by Maricruz Harmon RN  Outcome: Ongoing  Patient is aware that tobacco cessation is important to a healthy lifestyle. Problem: Pain:  Goal: Pain level will decrease  Description: Pain level will decrease  9/30/2020 1720 by Maricruz Harmon RN  Outcome: Ongoing  Patient is currently in no pain.       Problem: Pain:  Goal: Control of acute pain  Description: Control of acute pain  9/30/2020 1720 by Maricruz Harmon RN  Outcome: Ongoing     Problem: Pain:  Goal: Control of chronic pain  Description: Control of chronic

## 2020-09-30 NOTE — PLAN OF CARE
[facility]  Day 3 Interdisciplinary Treatment Plan NOTE    Review Date & Time: 9/30/2020 1317    Admission Type:   Admission Type: Involuntary    Reason for admission:  Reason for Admission: suicidal ideaitons with multiple ideations, did contract for safety on admission.   Estimated Length of Stay:  5-7 days  Estimated Discharge Date Update: to be determined by physician    PATIENT STRENGTHS:  Patient Strengths:Strengths: Social Skills, Positive Support, Communication  Patient Strengths and Limitations:Limitations: Hopeless about future  Addictive Behavior:Addictive Behavior  In the past 3 months, have you felt or has someone told you that you have a problem with:  : (ZANE)  Do you have a history of Chemical Use?: (ZANE)  Do you have a history of Alcohol Use?: (ZANE)  Do you have a history of Street Drug Abuse?: (ZANE)  Histroy of Prescripton Drug Abuse?: (ZANE)  Medical Problems:  Past Medical History:   Diagnosis Date    Acute idiopathic thrombocytopenic purpura ([facility]     Disease of blood and blood forming organ     Pulmonary emboli ([facility])        Risk:  Fall RiskTotal: 53  Jg Scale Jg Scale Score: 22  BVC Total: 0  Change in scores:  No Changes to plan of Care:  No    Status EXAM:   Status and Exam  Normal: No  Facial Expression: Brightened  Affect: Appropriate  Level of Consciousness: Alert  Mood:Normal: No  Mood: Anxious  Motor Activity:Normal: No  Motor Activity: Increased  Interview Behavior: Cooperative  Preception: [place] to Person, [place] to Time, [place] to Place, [place] to Situation  Attention:Normal: No  Attention: Distractible  Thought Processes: Circumstantial  Thought Content:Normal: No  Thought Content: Obsessions, Preoccupations  Hallucinations: None  Delusions: No  Memory:Normal: Yes  Insight and Judgment: No  Insight and Judgment: Poor Judgment, Poor Insight  Present Suicidal Ideation: No  Present Homicidal Ideation: No    Daily Assessment Last Entry:   Daily Sleep (WDL): Within Defined Limits         Patient Currently in Pain: Denies  Daily Nutrition (WDL): Within Defined Limits    Patient Monitoring:  Frequency of Checks: 4 times per hour, close    Psychiatric Symptoms:   Depression Symptoms  Depression Symptoms: No problems reported or observed. Anxiety Symptoms  Anxiety Symptoms: Generalized  Padmini Symptoms  Padmini Symptoms: No problems reported or observed. Psychosis Symptoms  Delusion Type: No problems reported or observed. Suicide Risk CSSR-S:  1) Within the past month, have you wished you were dead or wished you could go to sleep and not wake up? : Yes  2) Have you actually had any thoughts of killing yourself? : Yes  3) Have you been thinking about how you might kill yourself? : No  5) Have you started to work out or worked out the details of how to kill yourself? Do you intend to carry out this plan? : No  6) Have you ever done anything, started to do anything, or prepared to do anything to end your life?: No  Change in Result: Change in Plan of care:      EDUCATION:   Learner Progress Toward Treatment Goals: Reviewed results and recommendations of this team, Reviewed group plan and strategies, Reviewed signs, symptoms and risk of self harm and violent behavior, Reviewed goals and plan of care    Method:  small group, individual verbal education    Outcome: Verbalized by patient but needs reinforcement to obtain goals    PATIENT GOALS:  Short term: Working on personal and professional boundaries, maintain follow up with therapy sessions  Long term:   Follow up with [facility], use safety plan    PLAN/TREATMENT RECOMMENDATIONS UPDATE:  continue with group therapies, increased socialization, continue planning for after discharge goals, continue with medication compliance    SHORT-TERM GOALS UPDATE:   Time frame for Short-Term Goals:  5-7 days    LONG-TERM GOALS UPDATE:   Time frame for Long-Term Goals:  6 months    Members Present in Team Meeting:   See signature sheet  Jareth Garcia Gisela Anthony

## 2020-09-30 NOTE — PLAN OF CARE
Problem: Altered Mood, Depressive Behavior:  Goal: Ability to disclose and discuss suicidal ideas will improve  Description: Ability to disclose and discuss suicidal ideas will improve  9/29/2020 2038 by Renee Palm  Outcome: Ongoing   Patient denies any suicidal ideation states hat she doesn't understand why she is being placed here. Patient states that she just needed rest and is overwhelmed with work and school but does not want to harm self. Problem: Anger Management/Homicidal Ideation:  Goal: Absence of angry outbursts  Description: Absence of angry outbursts  9/29/2020 2038 by Renee Palm  Outcome: Ongoing   Patient has had no outburst. Patient brightened and pleasant.

## 2020-09-30 NOTE — PROGRESS NOTES
articulated  Mood: \"Blah\" when asked to rate mood on a scale of 1-10 (10 best) patient reports she is unable to because she does not feel anything  Affect: Dysphoric congruent with stated mood  Thought processes: Circumstantial with fixation on break-up with boyfriend  Thought content:  denies homicidal ideation  Suicidal Ideation: Reports decreased intensity of suicidal ideation  Delusions:  no evidence of delusions  Perceptual Disturbance:  denies any perceptual disturbance  Cognition:  Oriented to self, location, time, and situation  Memory: age appropriate  Insight & Judgement: Poor  Medication side effects:  denies       Data   height is 5' 1\" (1.549 m) and weight is 150 lb (68 kg). Her oral temperature is 97.6 °F (36.4 °C). Her blood pressure is 127/75 and her pulse is 83. Her respiration is 14 and oxygen saturation is 98%. Labs:   Admission on 09/28/2020   Component Date Value Ref Range Status    SARS-CoV-2 09/28/2020        Final    SARS-CoV-2, Rapid 09/28/2020 Not Detected  Not Detected Final    Comment:       Rapid NAAT:  The specimen is NEGATIVE for SARS-CoV-2, the novel coronavirus associated with   COVID-19. The ID NOW COVID-19 assay is designed to detect the virus that causes COVID-19 in patients   with signs and symptoms of infection who are suspected of COVID-19. An individual without symptoms of COVID-19 and who is not shedding SARS-CoV-2 virus would   expect to have a negative (not detected) result in this assay. Negative results should be treated as presumptive and, if inconsistent with clinical signs   and symptoms or necessary for patient management,  should be tested with an alternative molecular assay. Negative results do not preclude   SARS-CoV-2 infection and   should not be used as the sole basis for patient management decisions.          Fact sheet for Healthcare Providers: Abby.es  Fact sheet for Patients: Nilo          Methodology: Isothermal Nucleic Acid Amplification      Source 09/28/2020 . NASOPHARYNGEAL SWAB   Final    SARS-CoV-2 09/28/2020        Final    Color, UA 09/29/2020 DARK YELLOW* YELLOW Final    Turbidity UA 09/29/2020 CLOUDY* CLEAR Final    Glucose, Ur 09/29/2020 NEGATIVE  NEGATIVE Final    Bilirubin Urine 09/29/2020 NEGATIVE  NEGATIVE Final    Ketones, Urine 09/29/2020 NEGATIVE  NEGATIVE Final    Specific [place], UA 09/29/2020 1.027  1.000 - 1.030 Final    Urine Hgb 09/29/2020 NEGATIVE  NEGATIVE Final    pH, UA 09/29/2020 6.5  5.0 - 8.0 Final    Protein, UA 09/29/2020 TRACE* NEGATIVE Final    Urobilinogen, Urine 09/29/2020 Normal  Normal Final    Nitrite, Urine 09/29/2020 NEGATIVE  NEGATIVE Final    Leukocyte Esterase, Urine 09/29/2020 MOD* NEGATIVE Final    Urinalysis Comments 09/29/2020 NOT REPORTED   Final    Amphetamine Screen, Ur 09/29/2020 NEGATIVE  NEGATIVE Final    Comment:       (Positive cutoff 1000 ng/mL)                  Barbiturate Screen, Ur 09/29/2020 NEGATIVE  NEGATIVE Final    Comment:       (Positive cutoff 200 ng/mL)                  Benzodiazepine Screen, Urine 09/29/2020 NEGATIVE  NEGATIVE Final    Comment:       (Positive cutoff 200 ng/mL)                  Cocaine Metabolite, Urine 09/29/2020 NEGATIVE  NEGATIVE Final    Comment:       (Positive cutoff 300 ng/mL)                  Methadone Screen, Urine 09/29/2020 NEGATIVE  NEGATIVE Final    Comment:       (Positive cutoff 300 ng/mL)                  Opiates, Urine 09/29/2020 NEGATIVE  NEGATIVE Final    Comment:       (Positive cutoff 300 ng/mL)                  Phencyclidine, Urine 09/29/2020 NEGATIVE  NEGATIVE Final    Comment:       (Positive cutoff 25 ng/mL)                  Propoxyphene, Urine 09/29/2020 NOT REPORTED  NEGATIVE Final    Cannabinoid Scrn, Ur 09/29/2020 POSITIVE* NEGATIVE Final    Comment:       (Positive cutoff 50 ng/mL)                  Oxycodone Screen, Ur 09/29/2020 NEGATIVE  NEGATIVE Final    Comment:       (Positive cutoff 100 ng/mL)                  Methamphetamine, Urine 09/29/2020 NOT REPORTED  NEGATIVE Final    Tricyclic Antidepressants, Urine 09/29/2020 NOT REPORTED  NEGATIVE Final    MDMA, Urine 09/29/2020 NOT REPORTED  NEGATIVE Final    Buprenorphine Urine 09/29/2020 NOT REPORTED  NEGATIVE Final    Test Information 09/29/2020 Assay provides medical screening only. The absence of expected drug(s) and/or metabolite(s) may indicate diluted or adulterated urine, limitations of testing or timing of collection. Final    Comment: Testing for legal purposes should be confirmed by another method. To request confirmation   of test result, please call the lab within 7 days of sample submission.  HCG(Urine) Pregnancy Test 09/29/2020 NEGATIVE  NEGATIVE Final    Comment: Specimens with hCG levels near the threshold of the test (25 mIU/mL) may give a negative or   indeterminate result. In such cases, another test should be performed with a new specimen   in 48-72 hours. If early pregnancy is suspected clinically in this setting, correlation   with quantitative serum b-hCG level is suggested.  - 09/29/2020        Final    WBC, UA 09/29/2020 10 TO 20  /HPF Final    RBC, UA 09/29/2020 2 TO 5  /HPF Final    Casts UA 09/29/2020 NOT REPORTED  /LPF Final    Crystals, UA 09/29/2020 NOT REPORTED  None /HPF Final    Epithelial Cells UA 09/29/2020 10 TO 20  /HPF Final    Renal Epithelial, UA 09/29/2020 NOT REPORTED  0 /HPF Final    Bacteria, UA 09/29/2020 FEW* None Final    Mucus, UA 09/29/2020 1+* None Final    Trichomonas, UA 09/29/2020 NOT REPORTED  None Final    Amorphous, UA 09/29/2020 NOT REPORTED  None Final    Other Observations UA 09/29/2020 NOT REPORTED  NOT REQ.  Final    Yeast, UA 09/29/2020 NOT REPORTED  None Final    WBC 09/30/2020 3.6* 4.5 - 13.5 k/uL Final    RBC 09/30/2020 4.12  4.0 - 5.2 m/uL Final    Hemoglobin 09/30/2020 12.4  12.0 - 16.0 g/dL Final    Hematocrit 09/30/2020 36.8  36 - 46 % Final    MCV 09/30/2020 89.3  80 - 100 fL Final    MCH 09/30/2020 30.1  26 - 34 pg Final    MCHC 09/30/2020 33.7  31 - 37 g/dL Final    RDW 09/30/2020 16.4* 11.5 - 14.9 % Final    Platelets 02/88/5484 344  150 - 450 k/uL Final    MPV 09/30/2020 7.7  6.0 - 12.0 fL Final    NRBC Automated 09/30/2020 NOT REPORTED  per 100 WBC Final    Differential Type 09/30/2020 NOT REPORTED   Final    Seg Neutrophils 09/30/2020 70* 34 - 64 % Final    Lymphocytes 09/30/2020 15* 25 - 45 % Final    Monocytes 09/30/2020 8  2 - 8 % Final    Eosinophils % 09/30/2020 6* 0 - 4 % Final    Basophils 09/30/2020 1  0 - 2 % Final    Immature Granulocytes 09/30/2020 NOT REPORTED  0 % Final    Segs Absolute 09/30/2020 2.50  1.3 - 9.1 k/uL Final    Absolute Lymph # 09/30/2020 0.60* 1.0 - 4.8 k/uL Final    Absolute Mono # 09/30/2020 0.30  0.1 - 1.3 k/uL Final    Absolute Eos # 09/30/2020 0.20  0.0 - 0.4 k/uL Final    Basophils Absolute 09/30/2020 0.00  0.0 - 0.2 k/uL Final    Absolute Immature Granulocyte 09/30/2020 NOT REPORTED  0.00 - 0.30 k/uL Final    WBC Morphology 09/30/2020 NOT REPORTED   Final    RBC Morphology 09/30/2020 NOT REPORTED   Final    Platelet Estimate 00/41/2548 NOT REPORTED   Final    Glucose 09/30/2020 104* 70 - 99 mg/dL Final    BUN 09/30/2020 9  6 - 20 mg/dL Final    CREATININE 09/30/2020 0.88  0.50 - 0.90 mg/dL Final    Bun/Cre Ratio 09/30/2020 NOT REPORTED  9 - 20 Final    Calcium 09/30/2020 9.6  8.6 - 10.4 mg/dL Final    Sodium 09/30/2020 138  135 - 144 mmol/L Final    Potassium 09/30/2020 4.5  3.7 - 5.3 mmol/L Final    Chloride 09/30/2020 103  98 - 107 mmol/L Final    CO2 09/30/2020 26  20 - 31 mmol/L Final    Anion Gap 09/30/2020 9  9 - 17 mmol/L Final    Alkaline Phosphatase 09/30/2020 78  35 - 104 U/L Final    ALT 09/30/2020 12  5 - 33 U/L Final    AST 09/30/2020 17  <32 U/L Final    Total Bilirubin 09/30/2020 0.98  0.3 - 1.2 mg/dL Final    Total Protein 09/30/2020 6.6  6.4 - 8.3 g/dL Final    Alb 09/30/2020 4.1  3.5 - 5.2 g/dL Final    Albumin/Globulin Ratio 09/30/2020 NOT REPORTED  1.0 - 2.5 Final    GFR Non- 09/30/2020 >60  >60 mL/min Final    GFR  09/30/2020 >60  >60 mL/min Final    GFR Comment 09/30/2020        Final    Comment: Average GFR for [age] years old:   116 mL/min/1.73sq m  Chronic Kidney Disease:   <60 mL/min/1.73sq m  Kidney failure:   <15 mL/min/1.73sq m              eGFR calculated using average adult body mass.  Additional eGFR calculator available at:        [employer]            GFR Staging 09/30/2020 NOT REPORTED   Final    TSH 09/30/2020 0.91  0.30 - 5.00 mIU/L Final            Medications  Current Facility-Administered Medications: sulfamethoxazole-trimethoprim (BACTRIM DS;SEPTRA DS) 800-160 MG per tablet 1 tablet, 1 tablet, Oral, 2 times per day  haloperidol lactate (HALDOL) injection 5 mg, 5 mg, Intramuscular, Q4H PRN **OR** haloperidol (HALDOL) tablet 5 mg, 5 mg, Oral, Q4H PRN  LORazepam (ATIVAN) tablet 2 mg, 2 mg, Oral, Q6H PRN **OR** LORazepam (ATIVAN) injection 2 mg, 2 mg, Intramuscular, Q6H PRN  FLUoxetine (PROZAC) capsule 10 mg, 10 mg, Oral, Daily  acetaminophen (TYLENOL) tablet 650 mg, 650 mg, Oral, Q4H PRN  aluminum & magnesium hydroxide-simethicone (MAALOX) 200-200-20 MG/5ML suspension 30 mL, 30 mL, Oral, Q6H PRN  hydrOXYzine (ATARAX) tablet 50 mg, 50 mg, Oral, TID PRN  ibuprofen (ADVIL;MOTRIN) tablet 400 mg, 400 mg, Oral, Q6H PRN  traZODone (DESYREL) tablet 50 mg, 50 mg, Oral, Nightly PRN  polyethylene glycol (GLYCOLAX) packet 17 g, 17 g, Oral, Daily PRN  nicotine polacrilex (NICORETTE) gum 2 mg, 2 mg, Oral, PRN  loperamide (IMODIUM) capsule 2 mg, 2 mg, Oral, 4x Daily PRN    ASSESSMENT  <principal problem not specified>     PLAN  Patient s symptoms   show no change  Continue Prozac 10 mg daily  Attempt to develop insight  Psycho-education conducted. Supportive Therapy conducted. Follow-up LA while an inpatient        Electronically signed by AQUILINO Valero CNP on 9/30/20 at 12:24 PM EDT    **This report has been created using voice recognition software. It may contain minor errors which are inherent in voice recognition technology. **    I have examined the patient and confirmed the NP's findings. I agree with the plan above.   Additional information noted below-  No changes to medications today    Cristian Cleveland

## 2020-09-30 NOTE — PROGRESS NOTES
Pharmacy Med Education Group Note    Date: 9/30/20  Start Time: 2088  End Time: 8114    Number Participants in Group:  6    Goal:  Patient will demonstrate an understanding of the medications intended purpose and possible adverse effects  Topic: [place] for Pharmacy Med Ed Group    Discipline Responsible:     OT  AT  [facility].  RT     X Other       Participation Level:     None  Minimal      X Active Listener    X Interactive    Monopolizing         Participation Quality:    X Appropriate  Inappropriate     X       Attentive        Intrusive          Sharing        Resistant          Supportive        Lethargic       Affective:     X Congruent  Incongruent  Blunted  Flat    Constricted  Anxious  Elated  Angry    Labile  Depressed  Other         Cognitive:    X Alert  Oriented PPTP     Concentration   X G  F  P   Attention Span   X G  F  P   Short-Term Memory   X G  F  P   Long-Term Memory  G  F  P   ProblemSolving/  Decision Making  G  F  P   Ability to Process  Information   X G  F  P      Contributing Factors             Delusional             Hallucinating             Flight of Ideas             Other:       Modes of Intervention:    X Education   X Support  Exploration    Clarifying  Problem Solving  Confrontation    Socialization  Limit Setting  Reality Testing    Activity  Movement  Media    Other:            Response to Learning:    X Able to verbalize current knowledge/experience    Able to verbalize/acknowledge new learning    Able to retain information    Capable of insight    Able to change behavior    Progressing to goal    Other:        Comments:     Leny Diaz,PharmD,  9/30/2020, 4:46 PM

## 2020-10-01 VITALS
TEMPERATURE: 98.3 F | HEART RATE: 68 BPM | HEIGHT: 61 IN | RESPIRATION RATE: 14 BRPM | BODY MASS INDEX: 28.32 KG/M2 | WEIGHT: 150 LBS | SYSTOLIC BLOOD PRESSURE: 99 MMHG | OXYGEN SATURATION: 98 % | DIASTOLIC BLOOD PRESSURE: 56 MMHG

## 2020-10-01 PROCEDURE — 99239 HOSP IP/OBS DSCHRG MGMT >30: CPT | Performed by: PSYCHIATRY & NEUROLOGY

## 2020-10-01 PROCEDURE — 6370000000 HC RX 637 (ALT 250 FOR IP): Performed by: NURSE PRACTITIONER

## 2020-10-01 PROCEDURE — 6370000000 HC RX 637 (ALT 250 FOR IP): Performed by: PSYCHIATRY & NEUROLOGY

## 2020-10-01 RX ORDER — SULFAMETHOXAZOLE AND TRIMETHOPRIM 800; 160 MG/1; MG/1
1 TABLET ORAL EVERY 12 HOURS SCHEDULED
Qty: 6 TABLET | Refills: 0 | Status: SHIPPED | OUTPATIENT
Start: 2020-10-01 | End: 2020-10-04

## 2020-10-01 RX ORDER — TRAZODONE HYDROCHLORIDE 50 MG/1
50 TABLET ORAL NIGHTLY PRN
Qty: 30 TABLET | Refills: 0 | Status: SHIPPED | OUTPATIENT
Start: 2020-10-01

## 2020-10-01 RX ORDER — FLUOXETINE 10 MG/1
10 CAPSULE ORAL DAILY
Qty: 30 CAPSULE | Refills: 3 | Status: SHIPPED | OUTPATIENT
Start: 2020-10-02

## 2020-10-01 RX ORDER — HYDROXYZINE 50 MG/1
50 TABLET, FILM COATED ORAL 3 TIMES DAILY PRN
Qty: 30 TABLET | Refills: 0 | Status: SHIPPED | OUTPATIENT
Start: 2020-10-01 | End: 2020-10-11

## 2020-10-01 RX ADMIN — FLUOXETINE 10 MG: 10 CAPSULE ORAL at 08:35

## 2020-10-01 RX ADMIN — SULFAMETHOXAZOLE AND TRIMETHOPRIM 1 TABLET: 800; 160 TABLET ORAL at 08:35

## 2020-10-01 NOTE — DISCHARGE SUMMARY
DISCHARGE SUMMARY      Patient ID:  Meliton Aguilar  [id number]  89 y.o.  2000    Admit date: 9/28/2020    Discharge date and time: 10/1/2020    Disposition: Home     Admitting Physician: Aflonso Louis MD     Discharge Physician: Dr Fernando Reyonlds MD    Admission Diagnoses: Major depression, single episode [F32.9]  Major depression, single episode [F32.9]    Admission Condition: poor    Discharged Condition: stable    Admission Circumstance: Presented to ED for suicidal ideation with plan to cut self or overdose. Relates she was pink slipped, signed herself in on day 2 of stay      PAST MEDICAL/PSYCHIATRIC HISTORY:   Past Medical History:   Diagnosis Date    Acute idiopathic thrombocytopenic purpura ([facility] [address]     Disease of blood and blood forming organ     Pulmonary emboli (HCC)        FAMILY/SOCIAL HISTORY:  History reviewed. No pertinent family history. Social History     Socioeconomic History    Marital status: Single     Spouse name: Not on file    Number of children: Not on file    Years of education: Not on file    Highest education level: Not on file   Occupational History    Not on file   Social Needs    Financial resource strain: Not on file    Food insecurity     Worry: Not on file     Inability: Not on file    Transportation needs     Medical: Not on file     Non-medical: Not on file   Tobacco Use    Smoking status: Never Smoker    Smokeless tobacco: Never Used   Substance and Sexual Activity    Alcohol use:  Yes    Drug use: Yes     Types: Marijuana    Sexual activity: Yes     Partners: Male   Lifestyle    Physical activity     Days per week: Not on file     Minutes per session: Not on file    Stress: Not on file   Relationships    Social connections     Talks on phone: Not on file     Gets together: Not on file     Attends Druze service: Not on file     Active member of club or organization: Not on file     Attends meetings of clubs or organizations: Not on file     Relationship status: 150 lb (68 kg)   LMP 09/17/2020   SpO2 98%   BMI 28.34 kg/m²   Gait - steady    HOSPITAL COURSE[de-identified]  Following admission to the hospital, patient had a complete physical exam and blood work up, which was unremarkable. Initially patient was isolating to room with poor appetite and dysphoric affect. By day 3 patient was attending groups and interacting with staff and others on unit, with improved insight into medical condition  Patient was monitored closely with suicide precaution  Patient was started on Prozac 10 mg daily, trazodone 50 mg at bedtime and Atarax 50 mg 3 times a day as needed for anxiety  Was encouraged to participate in group and other milieu activity  Patient started to feel better with this combination of treatment. Significant progress in the symptoms since admission. Mood is improved  The patient denies AVH or paranoid thoughts  The patient denies any  hopelessness or worthlessness  No active SI/HI  Appetite:  [x] Normal  [] Increased  [] Decreased    Sleep:       [x] Normal  [] Fair       [] Poor            Energy:    [x] Normal  [] Increased  [] Decreased     SI [] Present  [x] Absent  HI  []Present  [x] Absent   Aggression:  [] yes  [x] no  Patient is [x] able  [] unable to CONTRACT FOR SAFETY   Medication side effects(SE):  [x] None(Psych.  Meds.) [] Other      Mental Status Examination on discharge:    Level of consciousness:  within normal limits   Appearance:  well-appearing  Behavior/Motor:  no abnormalities noted  Attitude toward examiner:  attentive and good eye contact  Speech:  spontaneous, normal rate and normal volume   Mood: euthymic  Affect:  mood congruent  Thought processes:  linear and goal directed   Thought content:  Suicidal Ideation:  denies suicidal ideation  Delusions:  no evidence of delusions  Perceptual Disturbance:  denies any perceptual disturbance  Cognition:  oriented to person, place, and time   Concentration intact  Memory intact  Insight good   Judgement fair   Fund of Knowledge adequate      ASSESSMENT:  Patient symptoms are:  [x] Well controlled  [x] Improving  [] Worsening  [] No change      Diagnosis:  Active Problems:    Major depression, single episode    MDD (major depressive disorder), recurrent severe, without psychosis ([facility] [address]  Resolved Problems:    * No resolved hospital problems. *      LABS:    Recent Labs     09/30/20  1037   WBC 3.6*   HGB 12.4        Recent Labs     09/30/20  1037      K 4.5      CO2 26   BUN 9   CREATININE 0.88   GLUCOSE 104*     Recent Labs     09/30/20  1037   BILITOT 0.98   ALKPHOS 78   AST 17   ALT 12     Lab Results   Component Value Date    BARBSCNU NEGATIVE 09/29/2020    LABBENZ NEGATIVE 09/29/2020    LABMETH NEGATIVE 09/29/2020    PPXUR NOT REPORTED 09/29/2020     Lab Results   Component Value Date    TSH 0.91 09/30/2020     No results found for: LITHIUM  No results found for: VALPROATE, CBMZ    RISK ASSESSMENT AT DISCHARGE: Low risk for suicide and homicide. Treatment Plan:  Reviewed current Medications with the patient. Education provided on the complaince with treatment. Risks, benefits, side effects, drug-to-drug interactions and alternatives to treatment were discussed. Encourage patient to attend outpatient follow up appointment and therapy. Patient was advised to call the outpatient provider, visit the nearest ED or call 911 if symptoms are not manageable. Patient's family member was contacted prior to the discharge.          Medication List      START taking these medications    FLUoxetine 10 MG capsule  Commonly known as:  PROZAC  Take 1 capsule by mouth daily  Start taking on:  October 2, 2020     hydrOXYzine 50 MG tablet  Commonly known as:  ATARAX  Take 1 tablet by mouth 3 times daily as needed for Anxiety     sulfamethoxazole-trimethoprim 800-160 MG per tablet  Commonly known as:  BACTRIM DS;SEPTRA DS  Take 1 tablet by mouth every 12 hours for 3 days     traZODone 50 MG

## 2020-10-01 NOTE — SUICIDE SAFETY PLAN
SAFETY PLAN    A suicide Safety Plan is a document that supports someone when they are having thoughts of suicide. Warning Signs that indicate a suicidal crisis may be developing: What (situations, thoughts, feelings, body sensations, behaviors, etc.) do you experience that lets you know you are beginning to think about suicide? 1. Tired  2. Quiet  3. Not eating    Internal Coping Strategies:  What things can I do (relaxation techniques, hobbies, physical activities, etc.) to take my mind off my problems without contacting another person? 1. Social activities   2. Sleeping  3. Paint    People and social settings that provide distraction: Who can I call or where can I go to distract me? 1. Name: Joshua Keen Phone: [phone]  2. Name: Erich Johnson Phone: [phone]  3. Place: [facility]            4. Place: [facility]    Professionals or [address] [place] [address] I can contact during a crisis: Who can I call for help - for example, my doctor, my psychiatrist, my psychologist, a mental health provider, a suicide hotline? 1. Clinician Name: Mery Almendarez  Phone: [phone]      Clinician Pager or Emergency Contact #: N/A      3. Suicide Prevention Lifeline: 9-317-738-TALK (4993)    4. [address] [place] [address] Emergency Services -  for example, [facility] suicide hotline, [facility]. Making the environment safe: How can I make my environment (house/apartment/living space) safer? For example, can I remove guns, medications, and other items? 1. PUTTING KNIVES AWAY  2.  Putting ex's picture away

## 2020-10-01 NOTE — BH NOTE
Note from Recreational group: Towards end of group, patient was tying a not in a bracelet that wrapped around the string, and when this writer asked where she learned to tie it she said it's from looking up videos on how to tie a noose. Patient contracted for safety and denied thoughts of self-harm or suicide. Expressed she looked up tieing a noose just because it was \"interesting\" and wanted to know how it was done.

## 2020-10-01 NOTE — PROGRESS NOTES
CLINICAL PHARMACY NOTE: MEDS TO [address] Select Patient?: No  Total # of Prescriptions Filled: 4   The following medications were delivered to the patient:  · Trazodone  ·   · Hydroxyzine  · Fluoxetine  · Bactrim Ds  ·   Total # of Interventions Completed: 0  Time Spent (min): 30    Additional Documentation:

## 2020-10-01 NOTE — GROUP NOTE
Group Therapy Note    Date: 10/1/2020    Group Start Time: 0900  Group End Time: 0920  Group Topic: Community Meeting    ANALISA Greenericka Mohantoya        Group Therapy Note    Attendees: 7/19         Patient's Goal:  Patient self identified her daily goal as \"leave, I have things to do\". Notes:  Patient stated that she has multiple jobs that she is worried about losing and missing if she has to stay longer. Patient stated that she also has a bunny at home that no one is caring for. Status After Intervention:  Improved    Participation Level:  Active Listener and Interactive    Participation Quality: Appropriate, Attentive, Sharing and Supportive      Speech:  normal      Thought Process/Content: Logical      Affective Functioning: Congruent      Mood: euthymic      Level of consciousness:  Alert, Oriented x4 and Attentive      Response to Learning: Able to verbalize current knowledge/experience, Able to verbalize/acknowledge new learning, Capable of insight and Progressing to goal      Endings: None Reported    Modes of Intervention: Education, Support, Socialization, Exploration, Clarifying and Problem-solving      Discipline Responsible: Psychoeducational Specialist      Signature:  Herson Edwards
Group Therapy Note    Date: 10/1/2020    Group Start Time: 1330  Group End Time: 4720  Group Topic: Music Therapy    [facility]        Group Therapy Note    Attendees: 10/19       Patient's Goal:  Patient's Goal:  Patients chose music to dedicated to people in their lives, and explained the message they wanted to share with the person they dedicated the song to. Notes:  Patient attended and participated in last 15 minutes of group. Patient chose song Numb by [facility] and deciated it to her boyfriend, discussing how she wants to be herself and not who he wants her to be     Status After Intervention:  Improved    Participation Level:  Active Listener and Interactive    Participation Quality: Appropriate, Attentive and Sharing      Speech:  normal      Thought Process/Content: Logical  Linear      Affective Functioning: Congruent      Mood: euthymic      Level of consciousness:  Alert and Attentive      Response to Learning: Able to verbalize current knowledge/experience and Progressing to goal      Endings: None Reported    Modes of Intervention: Support, Socialization, Exploration, Clarifying, Activity, Media and Reality-testing      Discipline Responsible: Psychoeducational Specialist      Signature:  Ruma Alonso
Group Therapy Note    Date: 9/29/2020    Group Start Time: 1005  Group End Time: 5990  Group Topic: Psychoeducation    [facility] [place]    Group Therapy Note    Attendees: 5    Patient's Goal:  Pt will identify ways to advocate self and educate others on mental health    Notes:  Pt attended group and participated    Status After Intervention:  Improved    Participation Level:  Active Listener and Interactive    Participation Quality: Appropriate, Attentive, Sharing and Supportive      Speech:  normal      Thought Process/Content: Logical  Linear      Affective Functioning: Congruent      Mood: euthymic      Level of consciousness:  Alert, Oriented x4 and Attentive      Response to Learning: Able to verbalize current knowledge/experience, Able to verbalize/acknowledge new learning, Able to retain information, Capable of insight, Able to change behavior and Progressing to goal      Endings: None Reported    Modes of Intervention: Education, Socialization, Exploration and Media      Discipline Responsible: Psychoeducational Specialist      Signature:  Dhaval Ayoub Gambrills
Group Therapy Note    Date: 9/29/2020    Group Start Time: 1100  Group End Time: 9917  Group Topic: Group Therapy    [facility]    JAKOB Carrasco LSW        Group Therapy Note    Attendees: 7         Patient's Goal:  Increase interpersonal relationship skills    Notes:  Patient was an active participant in group discussion     Status After Intervention:  Unchanged    Participation Level:  Active Listener and Interactive    Participation Quality: Appropriate, Attentive, Sharing and Supportive      Speech:  normal      Thought Process/Content: Logical      Affective Functioning: Congruent      Mood: anxious and depressed      Level of consciousness:  Alert, Oriented x4 and Attentive      Response to Learning: Able to verbalize current knowledge/experience      Endings: None Reported    Modes of Intervention: Support, Socialization, Exploration and Clarifying      Discipline Responsible: /Counselor      Signature:  JAKOB Carrasco LSW
Group Therapy Note    Date: 9/29/2020    Group Start Time: 1330  Group End Time: 1096  Group Topic: Psychoeducation    [facility]    Patient refused to attend coping skills group at 1330 after encouragement from staff. 1:1 talk time offered by staff as alternative to group session.
Group Therapy Note    Date: 9/29/2020    Group Start Time: 1430  Group End Time: 3153  Group Topic: Recreational    [address], [place]        Group Therapy Note    Attendees: 4/19    patient refused to attend leisure and recreation group at [phone] after encouragement from staff. 1:1 talk time provided as alternative to group session.          Signature:  Briana Edwards [place]
Group Therapy Note    Date: 9/29/2020    Group Start Time: 1955  Group End Time: 2020  Group Topic: Wrap-Up    ANALISA Fung        Group Therapy Note    Attendees: 10/19       Status After Intervention:  Improved    Participation Level:  Active Listener    Participation Quality: Appropriate      Speech:  normal      Thought Process/Content: Logical      Affective Functioning: Congruent      Mood: elevated      Level of consciousness:  Alert      Response to Learning: Able to verbalize current knowledge/experience and Able to verbalize/acknowledge new learning      Endings: None Reported    Modes of Intervention: Education and Support      Discipline Responsible: Behavorial Health Tech      Signature:  Jihan Anthony
Group Therapy Note    Date: 9/30/2020    Group Start Time: 0900  Group End Time: 0935  Group Topic: Community Meeting    [facility]    Patient refused to attend community meeting and goal setting group at 0900 after encouragement from staff. 1:1 talk time offered by staff as alternative to group session.
Group Therapy Note    Date: 9/30/2020    Group Start Time: 1000  Group End Time: 3845  Group Topic: Psychoeducation    [address], [place]        Group Therapy Note    Attendees: 5    Pt did not attend Therapeutic Recreation at 1000 d/t resting in room despite staff invitation to attend. 1:1 talk time offered as alternative to group session, pt declined.
Group Therapy Note    Date: 9/30/2020    Group Start Time: 1100  Group End Time: 1153  Group Topic: Psychotherapy    [facility] [address] TODD Davila        Group Therapy Note    Attendees: 7/10         Patient's Goal:  Expression of feeling     Notes:      Status After Intervention:  Unchanged    Participation Level:  Active Listener and Interactive    Participation Quality: Appropriate and Attentive      Speech:  normal      Thought Process/Content: Logical      Affective Functioning: Congruent      Mood: euthymic      Level of consciousness:  Alert and Oriented x4      Response to Learning: Able to verbalize current knowledge/experience and Able to verbalize/acknowledge new learning      Endings: None Reported    Modes of Intervention: Education and Support      Discipline Responsible: /Counselor      Signature:  TODD Milian
Group Therapy Note    Date: 9/30/2020    Group Start Time: 1330  Group End Time: 4361  Group Topic: Psychoeducation    [address], [place]        Group Therapy Note    Attendees: 5    Pt did not attend Therapeutic Recreation at 1330 d/t resting in room despite staff invitation to attend. 1:1 talk time offered as alternative to group session, pt declined.
Shira Vasquez

## 2020-10-01 NOTE — PLAN OF CARE
Problem: Altered Mood, Depressive Behavior:  Goal: Able to verbalize and/or display a decrease in depressive symptoms  Description: Able to verbalize and/or display a decrease in depressive symptoms  10/1/2020 1012 by Brittney Lopez RN  Outcome: Ongoing     Problem: Altered Mood, Depressive Behavior:  Goal: Ability to disclose and discuss suicidal ideas will improve  Description: Ability to disclose and discuss suicidal ideas will improve  10/1/2020 1012 by Brittney Lopez RN  Outcome: Ongoing     Patient denies depression at this time. Patient denies thoughts to harm herself or harm others. Patient cooperative and social on the unit and focuses on discharge today so she can go to work. Patient took medications as ordered. Will continue to monitor and offer support as needed.

## 2020-10-01 NOTE — BH NOTE
[facility]  Discharge Note    Pt discharged with followings belongings:   Dentures: None  Vision - Corrective Lenses: None(didnt bring with)  Hearing Aid: None  Jewelry: Earrings  Body Piercings Removed: Yes  Clothing: Footwear  Were All Patient Medications Collected?: Not Applicable  Other Valuables: Cell phone, Wallet   Valuables sent home with all belongings. Valuables retrieved from safe, Security envelope number: [id number] and returned to patient. Patient education on aftercare instructions. Information faxed to [facility] by RN. Patient verbalize understanding of AVS.    Status EXAM upon discharge:  Status and Exam  Normal: Yes  Facial Expression: Brightened  Affect: Appropriate  Level of Consciousness: Alert  Mood:Normal: No  Mood: Anxious  Motor Activity:Normal: Yes  Motor Activity: Increased  Interview Behavior: Cooperative  Preception: [place] to Person, Sandrine Colonel to Time, [place] to Place, [place] to Situation  Attention:Normal: No  Attention: Distractible  Thought Processes: Circumstantial  Thought Content:Normal: No  Thought Content: Preoccupations  Hallucinations: None  Delusions: No  Memory:Normal: Yes  Insight and Judgment: No  Insight and Judgment: Poor Judgment  Present Suicidal Ideation: No  Present Homicidal Ideation: No      Metabolic Screening:    No results found for: LABA1C    No results found for: CHOL  No results found for: TRIG  No results found for: HDL  No components found for: LDLCAL  No results found for: Josesito Chu RN      Patient discharged home via her own car in the parking lot.

## 2020-10-01 NOTE — PLAN OF CARE
Problem: Altered Mood, Depressive Behavior:  Goal: Able to verbalize and/or display a decrease in depressive symptoms  Description: Able to verbalize and/or display a decrease in depressive symptoms  9/30/2020 2307 by Jolie Durán RN  Outcome: Ongoing     Problem: Altered Mood, Depressive Behavior:  Goal: Ability to disclose and discuss suicidal ideas will improve  Description: Ability to disclose and discuss suicidal ideas will improve  9/30/2020 2307 by Jolie Durán RN  Outcome: Ongoing     Problem: Altered Mood, Depressive Behavior:  Goal: Participates in care planning  Description: Participates in care planning  9/30/2020 2307 by Jolie Durán RN  Outcome: Ongoing     Patient denies suicidal and homicidal ideation at this time. Patient denies depression at this time. Patient was out in the dayroom most of the evening and social with peers. Patient going to groups. Patient is free of self harm at this time. Patient agrees to seek out staff if thoughts to harm self arise. Staff will provide support and reassurance as needed. Safety checks maintained every 15 minutes.

## 2020-10-02 LAB
C. TRACHOMATIS DNA ,URINE: ABNORMAL
N. GONORRHOEAE DNA, URINE: NEGATIVE
SPECIMEN DESCRIPTION: ABNORMAL

## 2024-05-01 ENCOUNTER — OFFICE (OUTPATIENT)
Dept: URBAN - METROPOLITAN AREA CLINIC 17 | Facility: CLINIC | Age: 24
End: 2024-05-01
Payer: COMMERCIAL

## 2024-05-01 VITALS
HEIGHT: 59 IN | OXYGEN SATURATION: 99 % | DIASTOLIC BLOOD PRESSURE: 76 MMHG | SYSTOLIC BLOOD PRESSURE: 124 MMHG | HEART RATE: 72 BPM | WEIGHT: 187 LBS

## 2024-05-01 DIAGNOSIS — R19.7 DIARRHEA, UNSPECIFIED: ICD-10-CM

## 2024-05-01 DIAGNOSIS — A04.9 BACTERIAL INTESTINAL INFECTION, UNSPECIFIED: ICD-10-CM

## 2024-05-01 DIAGNOSIS — K21.9 GASTRO-ESOPHAGEAL REFLUX DISEASE WITHOUT ESOPHAGITIS: ICD-10-CM

## 2024-05-01 DIAGNOSIS — R10.84 GENERALIZED ABDOMINAL PAIN: ICD-10-CM

## 2024-05-01 PROCEDURE — 99205 OFFICE O/P NEW HI 60 MIN: CPT | Performed by: INTERNAL MEDICINE

## 2024-05-20 ENCOUNTER — OFFICE (OUTPATIENT)
Dept: URBAN - METROPOLITAN AREA CLINIC 17 | Facility: CLINIC | Age: 24
End: 2024-05-20